# Patient Record
Sex: FEMALE | Race: WHITE
[De-identification: names, ages, dates, MRNs, and addresses within clinical notes are randomized per-mention and may not be internally consistent; named-entity substitution may affect disease eponyms.]

---

## 2017-12-17 ENCOUNTER — HOSPITAL ENCOUNTER (INPATIENT)
Dept: HOSPITAL 75 - ER | Age: 22
LOS: 2 days | Discharge: HOME | DRG: 747 | End: 2017-12-19
Attending: OBSTETRICS & GYNECOLOGY | Admitting: OBSTETRICS & GYNECOLOGY
Payer: MEDICAID

## 2017-12-17 VITALS — BODY MASS INDEX: 28.71 KG/M2 | HEIGHT: 62 IN | WEIGHT: 156 LBS

## 2017-12-17 VITALS — SYSTOLIC BLOOD PRESSURE: 119 MMHG | DIASTOLIC BLOOD PRESSURE: 66 MMHG

## 2017-12-17 DIAGNOSIS — Z23: ICD-10-CM

## 2017-12-17 DIAGNOSIS — N76.4: Primary | ICD-10-CM

## 2017-12-17 LAB
ALBUMIN SERPL-MCNC: 4.2 GM/DL (ref 3.2–4.5)
ALT SERPL-CCNC: 13 U/L (ref 0–55)
ANION GAP SERPL CALC-SCNC: 14 MMOL/L (ref 5–14)
APTT BLD: 27 SEC (ref 24–35)
AST SERPL-CCNC: 11 U/L (ref 5–34)
BASOPHILS # BLD AUTO: 0 10^3/UL (ref 0–0.1)
BASOPHILS NFR BLD AUTO: 0 % (ref 0–10)
BILIRUB SERPL-MCNC: 0.3 MG/DL (ref 0.1–1)
BILIRUB UR QL STRIP: NEGATIVE
BUN SERPL-MCNC: 9 MG/DL (ref 7–18)
BUN/CREAT SERPL: 11
CALCIUM SERPL-MCNC: 10 MG/DL (ref 8.5–10.1)
CHLORIDE SERPL-SCNC: 102 MMOL/L (ref 98–107)
CO2 SERPL-SCNC: 26 MMOL/L (ref 21–32)
CREAT SERPL-MCNC: 0.82 MG/DL (ref 0.6–1.3)
EOSINOPHIL # BLD AUTO: 0 10^3/UL (ref 0–0.3)
EOSINOPHIL NFR BLD AUTO: 0 % (ref 0–10)
ERYTHROCYTE [DISTWIDTH] IN BLOOD BY AUTOMATED COUNT: 11.5 % (ref 10–14.5)
GFR SERPLBLD BASED ON 1.73 SQ M-ARVRAT: > 60 ML/MIN
GLUCOSE SERPL-MCNC: 92 MG/DL (ref 70–105)
HS C REACTIVE PROTEIN: 3.08 MG/DL (ref 0–0.5)
INR PPP: 1.1 (ref 0.8–1.4)
KETONES UR QL STRIP: NEGATIVE
LEUKOCYTE ESTERASE UR QL STRIP: (no result)
LYMPHOCYTES # BLD AUTO: 2 X 10^3 (ref 1–4)
LYMPHOCYTES NFR BLD AUTO: 15 % (ref 12–44)
MCH RBC QN AUTO: 31 PG (ref 25–34)
MCHC RBC AUTO-ENTMCNC: 34 G/DL (ref 32–36)
MCV RBC AUTO: 89 FL (ref 80–99)
MONOCYTES # BLD AUTO: 1 X 10^3 (ref 0–1)
MONOCYTES NFR BLD AUTO: 8 % (ref 0–12)
NEUTROPHILS # BLD AUTO: 10.5 X 10^3 (ref 1.8–7.8)
NEUTROPHILS NFR BLD AUTO: 77 % (ref 42–75)
NITRITE UR QL STRIP: NEGATIVE
PH UR STRIP: 7 [PH] (ref 5–9)
PLATELET # BLD: 363 10^3/UL (ref 130–400)
PMV BLD AUTO: 9 FL (ref 7.4–10.4)
POTASSIUM SERPL-SCNC: 3.5 MMOL/L (ref 3.6–5)
PROT SERPL-MCNC: 8.3 GM/DL (ref 6.4–8.2)
PROT UR QL STRIP: NEGATIVE
PROTHROMBIN TIME: 14.5 SEC (ref 12.2–14.7)
RBC # BLD AUTO: 4.71 10^6/UL (ref 4.35–5.85)
SODIUM SERPL-SCNC: 142 MMOL/L (ref 135–145)
SP GR UR STRIP: 1.01 (ref 1.02–1.02)
SQUAMOUS #/AREA URNS HPF: >50 /HPF
UROBILINOGEN UR-MCNC: NORMAL MG/DL
WBC # BLD AUTO: 13.6 10^3/UL (ref 4.3–11)
WBC #/AREA URNS HPF: (no result) /HPF

## 2017-12-17 PROCEDURE — 85730 THROMBOPLASTIN TIME PARTIAL: CPT

## 2017-12-17 PROCEDURE — 87070 CULTURE OTHR SPECIMN AEROBIC: CPT

## 2017-12-17 PROCEDURE — 96374 THER/PROPH/DIAG INJ IV PUSH: CPT

## 2017-12-17 PROCEDURE — 87077 CULTURE AEROBIC IDENTIFY: CPT

## 2017-12-17 PROCEDURE — 84703 CHORIONIC GONADOTROPIN ASSAY: CPT

## 2017-12-17 PROCEDURE — 81000 URINALYSIS NONAUTO W/SCOPE: CPT

## 2017-12-17 PROCEDURE — 96375 TX/PRO/DX INJ NEW DRUG ADDON: CPT

## 2017-12-17 PROCEDURE — 87081 CULTURE SCREEN ONLY: CPT

## 2017-12-17 PROCEDURE — 85610 PROTHROMBIN TIME: CPT

## 2017-12-17 PROCEDURE — 85025 COMPLETE CBC W/AUTO DIFF WBC: CPT

## 2017-12-17 PROCEDURE — 96361 HYDRATE IV INFUSION ADD-ON: CPT

## 2017-12-17 PROCEDURE — 87186 SC STD MICRODIL/AGAR DIL: CPT

## 2017-12-17 PROCEDURE — 87205 SMEAR GRAM STAIN: CPT

## 2017-12-17 PROCEDURE — 87040 BLOOD CULTURE FOR BACTERIA: CPT

## 2017-12-17 PROCEDURE — 86141 C-REACTIVE PROTEIN HS: CPT

## 2017-12-17 PROCEDURE — 80053 COMPREHEN METABOLIC PANEL: CPT

## 2017-12-17 PROCEDURE — 87075 CULTR BACTERIA EXCEPT BLOOD: CPT

## 2017-12-17 PROCEDURE — 36415 COLL VENOUS BLD VENIPUNCTURE: CPT

## 2017-12-17 PROCEDURE — 83605 ASSAY OF LACTIC ACID: CPT

## 2017-12-17 RX ADMIN — SODIUM CHLORIDE SCH MLS/HR: 900 INJECTION, SOLUTION INTRAVENOUS at 23:12

## 2017-12-17 RX ADMIN — METRONIDAZOLE SCH MLS/HR: 5 INJECTION, SOLUTION INTRAVENOUS at 23:12

## 2017-12-17 NOTE — ED GU-FEMALE
General


Chief Complaint:  -Female


Stated Complaint:  VAGINAL CYST


Nursing Triage Note:  


PT PRESENTS TO ER WITH COMPLAINT OF VAGINAL CYST/ BUMP. STATES THAT SHE WENT TO 


[facility] ON FRIDAY 12/15/17. SHE STATES THERE THEY RAN MULTIPLE TESTS AND GAVE HER 

AN 


ANTIBIOTIC. SHE IS UNAWARE OF WHAT ANTIBIOTIC SHE WAS GIVEN. ALSO STATES SHE IS 


HAVING WHITE VAGINAL DISCHARGE, BUT DOES NOT FEEL IT IS FROM THE CYST.


Nursing Sepsis Screen:  No Definite Risk


Source:  patient, family (sister), other (boyfriend)


Exam Limitations:  no limitations





History of Present Illness


Time seen by provider:  18:55


Initial Comments


22-year-old female patient presents to the emergency department with complaints 

of approximately 2 weeks onset left labial swelling and pain.  Patient reports 

contacting her primary care provider at our medical clinic and was prescribed 

Diflucan.  Patient reports seeing the walk-in clinic at [facility] 

on Thursday and given 1000 mg of azithromycin.  Pap smear and vaginal cultures 

were obtained at that time.  Patient states she was scheduled with Dr. Darnell 

tomorrow as an outpatient.  Patient reports increased swelling, pain, redness, 

and vaginal discharge today.  Does complain of chills and sweats.  She is 

unable to sit at this time.


Timing/Duration:  getting worse, other (2 weeks onset)


Severity/Quality:  severe


Location:  other (left labia)


Activities at Onset:  none


Prior Genitourinary Problems:  none


Sexual [place] History:  less than 2 months ago, single partner


Modifying Factors:  Worsens With Movement, Worsens With Palpation, Worsens With 

Other (worse with sitting)





Allergies and Home Medications


Allergies


Coded Allergies:  


     No Known Drug Allergies (Unverified , 12/17/17)





Constitutional:  chills, diaphoresis, No fever, malaise


Respiratory:  no symptoms reported


Cardiovascular:  no symptoms reported


Gastrointestinal:  No abdominal pain, No constipation, No diarrhea, No nausea, 

No vomiting


Genitourinary:  see HPI, discharge, denies dysuria, denies frequency, denies 

flank pain, denies hematuria, denies pain


Musculoskeletal:  No back pain


Skin:  see HPI, lumps (left labia)


Psychiatric/Neurological:  No Symptoms Reported


All Other Systemes Reviewed


Negative Unless Noted:  Yes (Negative excepted noted.)





Past Medical-Social-Family Hx


Patient Social History


Alcohol Use:  Denies Use


Recreational Drug Use:  No


Smoking Status:  Never a Smoker


Recent Foreign Travel:  No


Contact w/Someone Who Travel:  No


Recent Infectious Disease Expo:  No


Recent Hopitalizations:  No


Physical Abuse:  No


Sexual Abuse:  No





Seasonal Allergies


Seasonal Allergies:  No





Surgeries


History of Surgeries:  No





Respiratory


History of Respiratory Disorde:  No





Cardiovascular


History of Cardiac Disorders:  No





Neurological


History of Neurological Disord:  No





Reproductive System


Hx Reproductive Disorders:  No


Sexually Transmitted Disease:  No


HIV/AIDS:  No





Genitourinary


History of Genitourinary Disor:  No





Gastrointestinal


History of Gastrointestinal Di:  No





Musculoskeletal


History of Musculoskeletal Dis:  No





Endocrine


History of Endocrine Disorders:  No





HEENT


History of HEENT Disorders:  No





Cancer


History of Cancer:  No





Psychosocial


History of Psychiatric Problem:  No


Suicide Risk Score:  0





Integumentary


History of Skin or Integumenta:  No





Blood Transfusions


History of Blood Disorders:  No


Adverse Reaction to a Blood Tr:  No





Reviewed Nursing Assessment


Reviewed/Agree w Nursing PMH:  Yes





Family Medical History


Significant Family History:  No Pertinent Family Hx





Physical Exam


Vital Signs





Vital Sign - Last 12Hours








 12/17/17





 18:16


 


Temp 100.0


 


Pulse 110


 


Resp 20


 


B/P (MAP) 126/80 (95)


 


Pulse Ox 98


 


O2 Delivery Room Air





Capillary Refill : Less Than 3 Seconds


General Appearance:  WD/WN, no apparent distress


HEENT:  PERRL/EOMI, pharynx normal


Neck:  supple, normal inspection


Cardiovascular:  normal peripheral pulses, regular rate, rhythm, no murmur


Respiratory:  lungs clear, normal breath sounds, no respiratory distress, no 

accessory muscle use


Gastrointestinal:  normal bowel sounds, non tender, soft, no organomegaly, No 

distended


Pelvic:  other (left labia swollen (approximately the size of a nectarine), 

erythematous, tenderness with 2 areas of ecchymosis.  patient unable to 

tolerate formal pelvic exam due to pain.)


Back:  normal inspection, no CVA tenderness


Extremities:  no pedal edema, normal capillary refill


Neurologic/Psychiatric:  alert, normal mood/affect, oriented x 3


Skin:  normal color, warm/dry, other (left labia swollen (approximately the 

size of a nectarine), erythematous, tenderness with 2 areas of ecchymosis.  )





Focused Exam


Evaluation


Sepsis Stage:  Sepsis


Possible Source:  Genitouriary


Lactate Level


Laboratory Tests


12/17/17 19:30: Lactic Acid Level 1.25





Time of Focused Exam:  19:30


Respiratory:  Lungs Clear, Normal Breath Sounds, No Accessory Muscle Use, No 

Respiratory Distress


Cardiovascular:  Regular Rate, Rhythm, No Murmur, Normal Peripheral Pulses


Capillary Refill:  Less Than 3 Seconds


Peripheral Pulses:  2+ Dorsalis Pedis (R), 2+ Left Dors-Pedis (L), 2+ Radial 

Pulses (R), 2+ Radial Pulses (L)


Skin:  normal color, warm/dry, No rash, No ulcerations, other (left labia 

swollen (approximately the size of a nectarine), erythematous, tenderness with 

2 areas of ecchymosis.  )


Lactic Acid Level








Progress/Results/Core Measures


Suspected Sepsis


Recent Fever Within 48 Hours:  No


Infection Criteria Present:  None


New/Unexplained  Altered Menta:  No


Sepsis Screen:  No Definite Risk


Sepsis Diagnosis:  


SIRS


Temperature:98.0 


Pulse: 110 


Respiratory Rate: 20


 


Laboratory Tests


12/17/17 19:30: White Blood Count 13.6H


Blood Pressure 126 /80 


Mean: 95


 





Laboratory Tests


12/17/17 19:30: Lactic Acid Level 1.25


Laboratory Tests


12/17/17 19:30: 


Creatinine 0.82, Platelet Count 363, Total Bilirubin 0.3


12/17/17 20:36: INR Comment 1.1








Results/Orders


Lab Results





Laboratory Tests








Test


  12/17/17


19:30 12/17/17


20:36 12/17/17


20:44 Range/Units


 


 


White Blood Count


  13.6 H


  


  


  4.3-11.0


10^3/uL


 


Red Blood Count


  4.71 


  


  


  4.35-5.85


10^6/uL


 


Hemoglobin 14.4    11.5-16.0  G/DL


 


Hematocrit 42    35-52  %


 


Mean Corpuscular Volume 89    80-99  FL


 


Mean Corpuscular Hemoglobin 31    25-34  PG


 


Mean Corpuscular Hemoglobin


Concent 34 


  


  


  32-36  G/DL


 


 


Red Cell Distribution Width 11.5    10.0-14.5  %


 


Platelet Count


  363 


  


  


  130-400


10^3/uL


 


Mean Platelet Volume 9.0    7.4-10.4  FL


 


Neutrophils (%) (Auto) 77 H   42-75  %


 


Lymphocytes (%) (Auto) 15    12-44  %


 


Monocytes (%) (Auto) 8    0-12  %


 


Eosinophils (%) (Auto) 0    0-10  %


 


Basophils (%) (Auto) 0    0-10  %


 


Neutrophils # (Auto) 10.5 H   1.8-7.8  X 10^3


 


Lymphocytes # (Auto) 2.0    1.0-4.0  X 10^3


 


Monocytes # (Auto) 1.0    0.0-1.0  X 10^3


 


Eosinophils # (Auto)


  0.0 


  


  


  0.0-0.3


10^3/uL


 


Basophils # (Auto)


  0.0 


  


  


  0.0-0.1


10^3/uL


 


Sodium Level 142    135-145  MMOL/L


 


Potassium Level 3.5 L   3.6-5.0  MMOL/L


 


Chloride Level 102      MMOL/L


 


Carbon Dioxide Level 26    21-32  MMOL/L


 


Anion Gap 14    5-14  MMOL/L


 


Blood Urea Nitrogen 9    7-18  MG/DL


 


Creatinine


  0.82 


  


  


  0.60-1.30


MG/DL


 


Estimat Glomerular Filtration


Rate > 60 


  


  


   


 


 


BUN/Creatinine Ratio 11     


 


Glucose Level 92      MG/DL


 


Lactic Acid Level


  1.25 


  


  


  0.50-2.00


MMOL/L


 


Calcium Level 10.0    8.5-10.1  MG/DL


 


Total Bilirubin 0.3    0.1-1.0  MG/DL


 


Aspartate Amino Transf


(AST/SGOT) 11 


  


  


  5-34  U/L


 


 


Alanine Aminotransferase


(ALT/SGPT) 13 


  


  


  0-55  U/L


 


 


Alkaline Phosphatase 90      U/L


 


C-Reactive Protein High


Sensitivity 3.08 H


  


  


  0.00-0.50


MG/DL


 


Total Protein 8.3 H   6.4-8.2  GM/DL


 


Albumin 4.2    3.2-4.5  GM/DL


 


Prothrombin Time  14.5   12.2-14.7  SEC


 


INR Comment  1.1   0.8-1.4  


 


Activated Partial


Thromboplast Time 


  27 


  


  24-35  SEC


 


 


Urine Color   YELLOW   


 


Urine Clarity


  


  


  SLIGHTLY


CLOUDY  


 


 


Urine pH   7  5-9  


 


Urine Specific Gravity   1.010 L 1.016-1.022  


 


Urine Protein   NEGATIVE  NEGATIVE  


 


Urine Glucose (UA)   NEGATIVE  NEGATIVE  


 


Urine Ketones   NEGATIVE  NEGATIVE  


 


Urine Nitrite   NEGATIVE  NEGATIVE  


 


Urine Bilirubin   NEGATIVE  NEGATIVE  


 


Urine Urobilinogen   NORMAL  NORMAL  MG/DL


 


Urine Leukocyte Esterase   1+ H NEGATIVE  


 


Urine RBC (Auto)   1+ H NEGATIVE  


 


Urine RBC   NONE   /HPF


 


Urine WBC   2-5   /HPF


 


Urine Squamous Epithelial


Cells 


  


  >50 H


   /HPF


 


 


Urine Crystals   NONE   /LPF


 


Urine Bacteria   FEW H  /HPF


 


Urine Casts   NONE   /LPF


 


Urine Mucus   NEGATIVE   /LPF


 


Urine Culture Indicated   NO   








My Orders





Orders - TAMMIE MOORE


Morphine  Injection (Morphine  Injection (12/17/17 19:07)


Cbc With Automated Diff (12/17/17 19:16)


Comprehensive Metabolic Panel (12/17/17 19:16)


Hs C Reactive Protein (12/17/17 19:16)


Lactic Acid Analyzer (12/17/17 19:16)


Saline Lock/Iv-Start (12/17/17 19:16)


Morphine  Injection (Morphine  Injection (12/17/17 19:16)


Ns Iv 1000 Ml (Sodium Chloride 0.9%) (12/17/17 19:16)


Blood Culture (12/17/17 20:06)


Wound Culture (12/17/17 20:06)


Protime With Inr (12/17/17 20:15)


Partial Thromboplastin Time (12/17/17 20:15)


Levofloxacin 750 Mg/150 Ml Iv (Levaquin (12/17/17 20:15)


Vital Signs Adult Sepsis Patie Q1H (12/17/17 20:15)


Remove Rings In Anticipation O (12/17/17 20:15)


Ketorolac Injection (Toradol Injection) (12/17/17 20:15)


Ns Iv 1000 Ml (Sodium Chloride 0.9%) (12/17/17 20:15)


Ua Culture If Indicated (12/17/17 20:24)


Urine Pregnancy Bedside (12/17/17 20:37)





Medications Given in ED





Current Medications








 Medications  Dose


 Ordered  Sig/Brett


 Route  Start Time


 Stop Time Status Last Admin


Dose Admin


 


 Levofloxacin/


 Dextrose  750 mg  ONCE  ONCE


 IV  12/17/17 20:15


 12/17/17 20:17 DC 12/17/17 20:26


750 MG


 


 Sodium Chloride  1,000 ml @ 


 0 mls/hr  Q0M ONCE


 IV  12/17/17 19:16


 12/17/17 19:18 DC 12/17/17 19:41


1,000 MLS/HR


 


 Sodium Chloride  1,000 ml @ 


 0 mls/hr  Q0M ONCE


 IV  12/17/17 20:15


 12/17/17 20:17 DC 12/17/17 20:25


1,000 MLS/HR








Vital Signs/I&O





Vital Sign - Last 12Hours








 12/17/17 12/17/17 12/17/17





 18:16 22:42 23:42


 


Temp 100.0 99.0 97.0


 


Pulse 110 100 95


 


Resp 20 20 18


 


B/P (MAP) 126/80 (95)  119/66 (83)


 


Pulse Ox 98 99 99


 


O2 Delivery Room Air Room Air Room Air














Intake and Output 


 


 12/18/17





 00:00


 


Intake Total 1000 ml


 


Balance 1000 ml





Capillary Refill : Less Than 3 Seconds








Blood Pressure Mean:  95











Departure


Communication (Admissions)


Time/Spoke to Admitting Phy:  20:27


Communication


dr. mcneill graciously accepts patient to his GYN service for IV antibiotics and 

IVF.


Progress Notes


patient noted to have a WBC count of 13.6 with a temp of 100.0 which meets 

criteria for sepsis with known infection of the left labia.  all laboratory 

findings and plan for admission discussed with the patient.  Patient verbalizes 

understanding and agrees with plan for admit.





Impression


Impression:  


 Primary Impression:  


 Sepsis


 Qualified Codes:  A41.9 - Sepsis, unspecified organism


 Additional Impression:  


 Left genital labial abscess


Disposition:  09 ADMITTED AS INPATIENT


Condition:  Stable





Admissions


Decision to Admit Reason:  Admit from ER (General)


Decision to Admit/Date:  Dec 17, 2017


Time/Decision to Admit Time:  20:25





Departure-Patient Inst.


Referrals:  


[facility] (PCP/Family)


Primary Care Physician


Scripts


No Active Prescriptions or Reported Meds











TAMMIE MOORE Dec 17, 2017 18:55

## 2017-12-17 NOTE — XMS REPORT
Continuity of Care Document

 Created on: 2017



ANDRADE BARLOW

External Reference #: [id number]

: 1995

Sex: Female



Demographics







 Preferred Language  Unknown

 

 Marital Status  Unknown

 

 [employer] Affiliation  Unknown

 

 Race  Unknown

 

 Ethnic Group  Unknown





Author







 Author  [facility]

 

 Address  Unknown

 

 Phone  Unavailable



              



Allergies

      



There is no data.                  



Medications

      



There is no data.                  



Problems

      





 Date Dx Coded            Attending            Type            Code            
Diagnosis            Diagnosed By        

 

 09/15/2010                                      558.9            
GASTROENTERITIS NONINFECTIOUS                     

 

 2011                                      V70.3            SPORTS/SCHOOL 
EXAM                     

 

 2011                                      845.00            SPRAIN/
STRAIN ANKLE                     

 

 2013                                      610.0            BREAST CYST  
                   

 

 2013                                      788.1            DYSURIA      
               

 

 2013                                      V20.2            WELL CHILD   
                  



                            



Procedures

      





 Code            Description            Performed By            Performed On   
     

 

             [id number]                                  UA LONG DIP                
                   2013        

 

             [id number]                                  Audiogram (Screening)      
                             2013        



                    



Results

      



There is no data.              



Encounters

      





 ACCT No.            Visit Date/Time            Discharge            Status    
        Pt. Type            Provider            Facility            Loc./Unit  
          Complaint        

 

 [id number]            2013 14:53:00            2013 23:59:59          
  [facility]            Outpatient                                                    
        

 

 [id number]            10/14/2013 19:39:00            10/14/2013 21:17:00    
        [facility]            Emergency

## 2017-12-17 NOTE — XMS REPORT
Continuity of Care Document

 Created on: 2017



ANDRADE BARLOW

External Reference #: [id number]

: 1995

Sex: Female



Demographics







 Preferred Language  Unknown

 

 Marital Status  Unknown

 

 Latter-day Affiliation  Unknown

 

 Race  Unknown

 

 Ethnic Group  Unknown





Author







 Author  [facility]

 

 Address  Unknown

 

 Phone  Unavailable



              



Allergies

      



There is no data.                  



Medications

      



There is no data.                  



Problems

      





 Date Dx Coded            Attending            Type            Code            
Diagnosis            Diagnosed By        

 

 09/15/2010                                      558.9            
GASTROENTERITIS NONINFECTIOUS                     

 

 2011                                      V70.3            SPORTS/SCHOOL 
EXAM                     

 

 2011                                      845.00            SPRAIN/
STRAIN ANKLE                     

 

 2013                                      610.0            BREAST CYST  
                   

 

 2013                                      788.1            DYSURIA      
               

 

 2013                                      V20.2            WELL CHILD   
                  



                            



Procedures

      





 Code            Description            Performed By            Performed On   
     

 

             20203                                  UA LONG DIP                
                   2013        

 

             [id number]                                  Audiogram (Screening)      
                             2013        



                    



Results

      



There is no data.              



Encounters

      





 ACCT No.            Visit Date/Time            Discharge            Status    
        Pt. Type            Provider            Facility            Loc./Unit  
          Complaint        

 

 [id number]            2013 14:53:00            2013 23:59:59          
  [facility]            Outpatient                                                    
        

 

 [id number]            10/14/2013 19:39:00            10/14/2013 21:17:00    
        [facility]            Emergency

## 2017-12-18 VITALS — DIASTOLIC BLOOD PRESSURE: 74 MMHG | SYSTOLIC BLOOD PRESSURE: 124 MMHG

## 2017-12-18 VITALS — SYSTOLIC BLOOD PRESSURE: 115 MMHG | DIASTOLIC BLOOD PRESSURE: 69 MMHG

## 2017-12-18 VITALS — DIASTOLIC BLOOD PRESSURE: 72 MMHG | SYSTOLIC BLOOD PRESSURE: 119 MMHG

## 2017-12-18 VITALS — DIASTOLIC BLOOD PRESSURE: 77 MMHG | SYSTOLIC BLOOD PRESSURE: 121 MMHG

## 2017-12-18 VITALS — DIASTOLIC BLOOD PRESSURE: 55 MMHG | SYSTOLIC BLOOD PRESSURE: 96 MMHG

## 2017-12-18 VITALS — SYSTOLIC BLOOD PRESSURE: 117 MMHG | DIASTOLIC BLOOD PRESSURE: 72 MMHG

## 2017-12-18 LAB
ALBUMIN SERPL-MCNC: 2.9 GM/DL (ref 3.2–4.5)
ALT SERPL-CCNC: 11 U/L (ref 0–55)
ANION GAP SERPL CALC-SCNC: 6 MMOL/L (ref 5–14)
AST SERPL-CCNC: 9 U/L (ref 5–34)
BASOPHILS # BLD AUTO: 0 10^3/UL (ref 0–0.1)
BASOPHILS NFR BLD AUTO: 0 % (ref 0–10)
BILIRUB SERPL-MCNC: 0.2 MG/DL (ref 0.1–1)
BUN SERPL-MCNC: 11 MG/DL (ref 7–18)
BUN/CREAT SERPL: 15
CALCIUM SERPL-MCNC: 8 MG/DL (ref 8.5–10.1)
CHLORIDE SERPL-SCNC: 112 MMOL/L (ref 98–107)
CO2 SERPL-SCNC: 23 MMOL/L (ref 21–32)
CREAT SERPL-MCNC: 0.72 MG/DL (ref 0.6–1.3)
EOSINOPHIL # BLD AUTO: 0.1 10^3/UL (ref 0–0.3)
EOSINOPHIL NFR BLD AUTO: 1 % (ref 0–10)
ERYTHROCYTE [DISTWIDTH] IN BLOOD BY AUTOMATED COUNT: 11.5 % (ref 10–14.5)
GFR SERPLBLD BASED ON 1.73 SQ M-ARVRAT: > 60 ML/MIN
GLUCOSE SERPL-MCNC: 121 MG/DL (ref 70–105)
HS C REACTIVE PROTEIN: 1.92 MG/DL (ref 0–0.5)
LYMPHOCYTES # BLD AUTO: 2.3 X 10^3 (ref 1–4)
LYMPHOCYTES NFR BLD AUTO: 20 % (ref 12–44)
MCH RBC QN AUTO: 31 PG (ref 25–34)
MCHC RBC AUTO-ENTMCNC: 34 G/DL (ref 32–36)
MCV RBC AUTO: 92 FL (ref 80–99)
MONOCYTES # BLD AUTO: 1.2 X 10^3 (ref 0–1)
MONOCYTES NFR BLD AUTO: 10 % (ref 0–12)
NEUTROPHILS # BLD AUTO: 8 X 10^3 (ref 1.8–7.8)
NEUTROPHILS NFR BLD AUTO: 69 % (ref 42–75)
PLATELET # BLD: 285 10^3/UL (ref 130–400)
PMV BLD AUTO: 8.9 FL (ref 7.4–10.4)
POTASSIUM SERPL-SCNC: 3.9 MMOL/L (ref 3.6–5)
PROT SERPL-MCNC: 5.6 GM/DL (ref 6.4–8.2)
RBC # BLD AUTO: 3.62 10^6/UL (ref 4.35–5.85)
SODIUM SERPL-SCNC: 141 MMOL/L (ref 135–145)
WBC # BLD AUTO: 11.6 10^3/UL (ref 4.3–11)

## 2017-12-18 PROCEDURE — 0U9MXZX DRAINAGE OF VULVA, EXTERNAL APPROACH, DIAGNOSTIC: ICD-10-PCS | Performed by: OBSTETRICS & GYNECOLOGY

## 2017-12-18 PROCEDURE — 0H99XZX DRAINAGE OF PERINEUM SKIN, EXTERNAL APPROACH, DIAGNOSTIC: ICD-10-PCS | Performed by: OBSTETRICS & GYNECOLOGY

## 2017-12-18 RX ADMIN — DOCUSATE SODIUM SCH MG: 100 CAPSULE ORAL at 09:27

## 2017-12-18 RX ADMIN — KETOROLAC TROMETHAMINE PRN MG: 30 INJECTION, SOLUTION INTRAMUSCULAR; INTRAVENOUS at 04:05

## 2017-12-18 RX ADMIN — HYDROMORPHONE HYDROCHLORIDE PRN MG: 2 INJECTION, SOLUTION INTRAMUSCULAR; INTRAVENOUS; SUBCUTANEOUS at 08:48

## 2017-12-18 RX ADMIN — METRONIDAZOLE SCH MLS/HR: 5 INJECTION, SOLUTION INTRAVENOUS at 08:16

## 2017-12-18 RX ADMIN — MORPHINE SULFATE PRN MG: 10 INJECTION, SOLUTION INTRAMUSCULAR; INTRAVENOUS at 13:32

## 2017-12-18 RX ADMIN — ONDANSETRON PRN MG: 2 INJECTION, SOLUTION INTRAMUSCULAR; INTRAVENOUS at 15:08

## 2017-12-18 RX ADMIN — DOCUSATE SODIUM SCH MG: 100 CAPSULE ORAL at 20:40

## 2017-12-18 RX ADMIN — ONDANSETRON PRN MG: 2 INJECTION, SOLUTION INTRAMUSCULAR; INTRAVENOUS at 10:28

## 2017-12-18 RX ADMIN — FAMOTIDINE SCH MG: 10 INJECTION INTRAVENOUS at 09:26

## 2017-12-18 RX ADMIN — SODIUM CHLORIDE SCH MLS/HR: 900 INJECTION, SOLUTION INTRAVENOUS at 04:08

## 2017-12-18 RX ADMIN — METRONIDAZOLE SCH MLS/HR: 5 INJECTION, SOLUTION INTRAVENOUS at 18:47

## 2017-12-18 RX ADMIN — SODIUM CHLORIDE, SODIUM LACTATE, POTASSIUM CHLORIDE, CALCIUM CHLORIDE, AND DEXTROSE MONOHYDRATE SCH MLS/HR: 600; 310; 30; 20; 5 INJECTION, SOLUTION INTRAVENOUS at 20:33

## 2017-12-18 RX ADMIN — SODIUM CHLORIDE, SODIUM LACTATE, POTASSIUM CHLORIDE, CALCIUM CHLORIDE, AND DEXTROSE MONOHYDRATE SCH MLS/HR: 600; 310; 30; 20; 5 INJECTION, SOLUTION INTRAVENOUS at 08:17

## 2017-12-18 RX ADMIN — KETOROLAC TROMETHAMINE PRN MG: 30 INJECTION, SOLUTION INTRAMUSCULAR; INTRAVENOUS at 15:08

## 2017-12-18 RX ADMIN — MORPHINE SULFATE PRN MG: 10 INJECTION, SOLUTION INTRAMUSCULAR; INTRAVENOUS at 13:39

## 2017-12-18 RX ADMIN — HYDROMORPHONE HYDROCHLORIDE PRN MG: 2 INJECTION, SOLUTION INTRAMUSCULAR; INTRAVENOUS; SUBCUTANEOUS at 18:47

## 2017-12-18 RX ADMIN — FAMOTIDINE SCH MG: 10 INJECTION INTRAVENOUS at 20:34

## 2017-12-18 RX ADMIN — KETOROLAC TROMETHAMINE PRN MG: 30 INJECTION, SOLUTION INTRAMUSCULAR; INTRAVENOUS at 20:36

## 2017-12-18 NOTE — OPERATIVE REPORT
DATE OF SERVICE:  



PREOPERATIVE DIAGNOSIS:

22-year-old female with left labial abscess.



POSTOPERATIVE DIAGNOSIS:

22-year-old female with left labial abscess.



PROCEDURE:

Incision and drainage of left labial abscess.



SURGEON:

Dr. Jordan Banks.



ANESTHESIA:

LMA.



ESTIMATED BLOOD LOSS:

Minimal.



URINE OUTPUT:

500 mL drained by indwelling catheter placement at the end of the procedure.



FLUIDS:

4000 mL lactate Ringer's solution.



FINDINGS:

A purulent exudate filled abscess of the left labia approximately 5 to 6 cm in

length from front to back and approximately 3 to 4 cm wide.  The dorsal margin

of this extends all the way to the edge of the buttocks.  The ventral margin

stops just proximal to the anterior fourchette.  There are two openings evident

upon prep and there is a copious amount of purulent exudate expressed.



SPECIMEN SENT:

Abscess cultures.



INDICATIONS FOR PROCEDURE:

This 22-year-old female was admitted from the Emergency Department last night

with a low grade temperature as well as a mildly elevated white count of 14. 

She reported dealing with this area on her vulva for the past two to three

weeks, underwent 1000 mg of azithromycin treatment for possible STD at [facility] as well as attempted several trials of over the counter

antifungals all without resolution and actually worsening of her symptoms.  This

morning, upon evaluation, the vulva is as described in my findings above.  I

discussed with the patient conservative management has failed.  At this point,

we will proceed with operative incision and drainage.  The procedure was

detailed with the patient including risk and risk of anesthesia.  After all of

her questions were answered, consent is obtained.  The patient was taken to the

operating room.



OPERATIVE REPORT IN DETAIL:

Once in the operating room, LMA anesthesia was found to be adequate.  She was

placed in the dorsal lithotomy position, prepped and draped in normal sterile

fashion.  The more medial opening of the wound opens up and begins draining upon

prepping the patient, I go ahead and incised the margins of the necrotic area of

this opening and resected using Metzenbaum scissors.  There is another necrotic

area on the surface of the skin that is incised as well.  At first, I am

suspicious that this is a separate pocket of fluid and abscess; however, upon

opening, it is connecting and there is a fenestration of the labia.  I clear out

all extent of the abscess exploring all extent of the abscess and proceeded with

copiously irrigating the abscess defect using gentamicin 80 mg in 1000 mL of

normal saline.  Approximately 800 mL of saline of this concentration is used to

irrigate, after which there was no active bleeding noted from any my dissection

planes.  I then packed the defect using quarter inch iodoform.  Tails were left

out of both incisional openings.  A [place] catheter is placed.  The patient

tolerated the procedure well, sent to recovery area in stable condition 750mg of

Levaquin, 500 mg of Flagyl are being given to the patient since admission, she

has received one dose of Levaquin and three doses of Flagyl at this point.





Job ID: [id number]

DocumentID: [id number]

Dictated Date:  12/18/2017 13:18:13

Transcription Date: 12/18/2017 20:15:04

Dictated By: DO FLORENCIO FAULKNER

## 2017-12-18 NOTE — HISTORY & PHYSICAL-OB/GYN
History of Present Illness


History of Present Illness


Reason for visit/HPI


Left labial abscess.  This patient reports having left labial swelling which 

has gotten substantially worse in the past 2-3 days.  Sought care at [facility] where 

she was given, azithromycin and tested for STDs.  She tried treatment with 

antifungal without any improvement and began running temp, and pain became much 

worse so she present to the ER.


Date of Admission


Dec 17, 2017 at 20:45


Date Seen by Provider:  Dec 18, 2017


Time Seen by Provider:  08:15


I consulted on this patient on


17


 08:25


Attending Physician


Sally Candelario DO


Admitting Physician


No,Local Physician


Consult








Allergies and Home Medications


Allergies


Coded Allergies:  


     No Known Drug Allergies (Unverified , 17)





Past Medical-Social-Family Hx


Patient Social History


Marrital Status:  single


Number of Children:  0


Number of living children:  0


Alcohol Use:  Denies Use


Recreational Drug Use:  No


Smoking Status:  Never a Smoker


Physical Abuse Screen:  No


Sexual Abuse:  No


Recent Foreign Travel:  No


Contact w/other who traveled:  No


Recent Hopitalizations:  No


Recent Infectious Disease Expo:  No





Seasonal Allergies


Seasonal Allergies:  No





Surgeries


No





Respiratory


No





Cardiovascular


No





Neurological


No





Reproductive System


Hx Reproductive Disorders:  No


Sexually Transmitted Disease:  No


HIV/AIDS:  No





Genitourinary


No





Gastrointestinal


No





Musculoskeletal


No





Endocrine


History of Endocrine Disorders:  No





HEENT


History of HEENT Disorders:  No





Cancer


No





Psychosocial


History of Psychiatric Problem:  No





Integumentary


History of Skin or Integumenta:  No





Blood Transfusions


History of Blood Disorders:  No


Adverse Reaction to a Blood Tr:  No





Reviewed Nursing Assessment


Reviewed/Agree w Nursing PMH:  Yes





Family Medical History


Significant Family History:  No Pertinent Family Hx





Constitutional:  see HPI


EENTM:  see HPI


Respiratory:  see HPI


Cardiovascular:  see HPI


Gastrointestinal:  see HPI


Genitourinary:  see HPI


Pregnant:  No


Musculoskeletal:  see HPI


Skin:  see HPI


Psychiatric/Neurological:  See HPI


All Other Systems Reviewed


Negative Unless Noted:  Yes





Physical Exam


Physical Exam


Vital Signs





Vital Signs








  Date Time  Temp Pulse Resp B/P (MAP) Pulse Ox O2 Delivery O2 Flow Rate FiO2


 


17 04:05 98.0 77 17 115/69 (84) 100 Room Air  


 


17 23:42 97.0 95 18 119/66 (83) 99 Room Air  


 


17 22:42 99.0 100 20  99 Room Air  


 


17 18:16 100.0 110 20 126/80 (95) 98 Room Air  














I & O 


 


 17





 07:00


 


Intake Total 3150 ml


 


Balance 3150 ml





Capillary Refill : Less Than 3 Seconds


Labs


Laboratory Tests


17 19:30: 


White Blood Count 13.6H, Red Blood Count 4.71, Hemoglobin 14.4, Hematocrit 42, 

Mean Corpuscular Volume 89, Mean Corpuscular Hemoglobin 31, Mean Corpuscular 

Hemoglobin Concent 34, Red Cell Distribution Width 11.5, Platelet Count 363, 

Mean Platelet Volume 9.0, Neutrophils (%) (Auto) 77H, Lymphocytes (%) (Auto) 15

, Monocytes (%) (Auto) 8, Eosinophils (%) (Auto) 0, Basophils (%) (Auto) 0, 

Neutrophils # (Auto) 10.5H, Lymphocytes # (Auto) 2.0, Monocytes # (Auto) 1.0, 

Eosinophils # (Auto) 0.0, Basophils # (Auto) 0.0, Sodium Level 142, Potassium 

Level 3.5L, Chloride Level 102, Carbon Dioxide Level 26, Anion Gap 14, Blood 

Urea Nitrogen 9, Creatinine 0.82, Estimat Glomerular Filtration Rate > 60, BUN/

Creatinine Ratio 11, Glucose Level 92, Lactic Acid Level 1.25, Calcium Level 

10.0, Total Bilirubin 0.3, Aspartate Amino Transf (AST/SGOT) 11, Alanine 

Aminotransferase (ALT/SGPT) 13, Alkaline Phosphatase 90, C-Reactive Protein 

High Sensitivity 3.08H, Total Protein 8.3H, Albumin 4.2


17 20:36: 


Prothrombin Time 14.5, INR Comment 1.1, Activated Partial Thromboplast Time 27


17 20:44: 


Urine Color YELLOW, Urine Clarity SLIGHTLY CLOUDY, Urine pH 7, Urine Specific 

Gravity 1.010L, Urine Protein NEGATIVE, Urine Glucose (UA) NEGATIVE, Urine 

Ketones NEGATIVE, Urine Nitrite NEGATIVE, Urine Bilirubin NEGATIVE, Urine 

Urobilinogen NORMAL, Urine Leukocyte Esterase 1+H, Urine RBC (Auto) 1+H, Urine 

RBC NONE, Urine WBC 2-5, Urine Squamous Epithelial Cells >50H, Urine Crystals 

NONE, Urine Bacteria FEWH, Urine Casts NONE, Urine Mucus NEGATIVE, Urine 

Culture Indicated NO


17 06:02: 


White Blood Count 11.6H, Red Blood Count 3.62L, Hemoglobin 11.2#L, Hematocrit 

33L, Mean Corpuscular Volume 92, Mean Corpuscular Hemoglobin 31, Mean 

Corpuscular Hemoglobin Concent 34, Red Cell Distribution Width 11.5, Platelet 

Count 285, Mean Platelet Volume 8.9, Neutrophils (%) (Auto) 69, Lymphocytes (%) 

(Auto) 20, Monocytes (%) (Auto) 10, Eosinophils (%) (Auto) 1, Basophils (%) (

Auto) 0, Neutrophils # (Auto) 8.0H, Lymphocytes # (Auto) 2.3, Monocytes # (Auto

) 1.2H, Eosinophils # (Auto) 0.1, Basophils # (Auto) 0.0, Sodium Level 141, 

Potassium Level 3.9, Chloride Level 112#H, Carbon Dioxide Level 23, Anion Gap 6

, Blood Urea Nitrogen 11, Creatinine 0.72, Estimat Glomerular Filtration Rate > 

60, BUN/Creatinine Ratio 15, Glucose Level 121H, Lactic Acid Level 0.77, 

Calcium Level 8.0L, Total Bilirubin 0.2, Aspartate Amino Transf (AST/SGOT) 9, 

Alanine Aminotransferase (ALT/SGPT) 11, Alkaline Phosphatase 68, C-Reactive 

Protein High Sensitivity 1.92H, Total Protein 5.6L, Albumin 2.9L





General Appearance:  Anxious, Mild Distress


Respiratory:  Lungs Clear


Cardiovascular:  Regular Rate, Rhythm


Abdominal:  normal bowel sounds


Gynecology/General:  Other (3x4 cm labial lesion, there is an area of erythema 

limited to the abscess, no surrounding evidence of cellulitis)


Labia:  Left, Abscess


Labia Mass:  soft, tender


Pelvic Exam:  deferred (could not tolerate)





Assessment/Plan


Assessment and Plan


Diagnosis:


21 yo G0 with Left labial abscess


Failed conservative treatment





P:


Plan of care discussed with the patient


I and D of left labial abscess, I discussed with her the details of the 

procedure, and possibility of postoperative packing and wound care as well as 

prolonged course of antibiotics.


Problems:  





Admission Diagnosis


21 yo G0 with Left labial abscess


Failed conservative treatment





Clinical Quality Measures


DVT/VTE Risk/Contraindication:


Risk Factor Score Per Nursin


RFS Level Per Nursing on Admit:  1=Low/No VTE PPX











SALLY CANDELARIO DO Dec 18, 2017 08:31

## 2017-12-19 VITALS — SYSTOLIC BLOOD PRESSURE: 107 MMHG | DIASTOLIC BLOOD PRESSURE: 63 MMHG

## 2017-12-19 VITALS — DIASTOLIC BLOOD PRESSURE: 61 MMHG | SYSTOLIC BLOOD PRESSURE: 100 MMHG

## 2017-12-19 VITALS — SYSTOLIC BLOOD PRESSURE: 95 MMHG | DIASTOLIC BLOOD PRESSURE: 53 MMHG

## 2017-12-19 RX ADMIN — HYDROCODONE BITARTRATE AND ACETAMINOPHEN PRN TAB: 5; 325 TABLET ORAL at 00:48

## 2017-12-19 RX ADMIN — HYDROCODONE BITARTRATE AND ACETAMINOPHEN PRN TAB: 5; 325 TABLET ORAL at 04:58

## 2017-12-19 RX ADMIN — DOCUSATE SODIUM SCH MG: 100 CAPSULE ORAL at 08:50

## 2017-12-19 RX ADMIN — METRONIDAZOLE SCH MLS/HR: 5 INJECTION, SOLUTION INTRAVENOUS at 02:41

## 2017-12-19 RX ADMIN — SODIUM CHLORIDE, SODIUM LACTATE, POTASSIUM CHLORIDE, CALCIUM CHLORIDE, AND DEXTROSE MONOHYDRATE SCH MLS/HR: 600; 310; 30; 20; 5 INJECTION, SOLUTION INTRAVENOUS at 06:56

## 2017-12-19 RX ADMIN — KETOROLAC TROMETHAMINE PRN MG: 30 INJECTION, SOLUTION INTRAMUSCULAR; INTRAVENOUS at 02:41

## 2017-12-19 NOTE — DISCHARGE INST-WOMEN'S SERVICE
Discharge Inst-Women's Serv


Depart Medication/Instructions


New, Converted or Re-Newed RX:  RX on Chart





Consults/Follow Up


Additional Follow Up:  Yes


Orders/Referrals


DR. Banks on Thursday





Activity


Activity:  Activity as Tolerated


Driving Instructions:  No Driving for 1 Week


NO SMOKING:  NO SMOKING


Nothing Inside Vagina:  No Douching, No [place], No Tampons





Diet


Discharge Diet:  No Restrictions


Symptoms to Report to :  Bleeding Excessive, Pain Increased, Fever Over 101 

Degrees F, Vaginal Bleeding Increase, Questions/Concerns


For Any Problems or Questions:  Contact Your Physician





Skin/Wound Care


Infection Signs and Symptoms:  Increased Redness, Increased Swelling, 

Temperature Above 101  F


Bathing Instructions:  SALLY Beach DO Dec 19, 2017 08:29

## 2017-12-19 NOTE — PROGRESS NOTE-STANDARD
Standard Progress Note


Progress Notes/Assess & Plan


Date Seen by Provider:  Dec 19, 2017


Time Seen by Provider:  08:30


Progress/Assessment & Plan


Patient reports feeling much better yesterday afternoon, and this AM.  Reports 

expected discomfort, but is now ambulating and voiding freely since catheter 

was removed. 








Vital Sign - Last 24 Hours








 12/18/17 12/18/17 12/18/17 12/18/17





 12:09 14:05 16:28 20:40


 


Temp 98.3 98.1 97.9 97.7


 


Pulse 79 82 77 81


 


Resp 18 18 18 18


 


B/P (MAP) 117/72 (87) 119/72 (88) 121/77 (92) 96/55 (69)


 


Pulse Ox 100 100 100 97


 


O2 Delivery Room Air Room Air Room Air Room Air


 


    





 12/19/17 12/19/17  





 00:48 04:58  


 


Temp 97.2 97.5  


 


Pulse 85 71  


 


Resp 18 18  


 


B/P (MAP) 95/53 (67) 107/63 (78)  


 


Pulse Ox 99 99  


 


O2 Delivery Room Air Room Air  














Intake and Output   


 


 12/18/17 12/18/17 12/19/17





 15:00 23:00 07:00


 


Intake Total 1300 ml  1300 ml


 


Output Total 560 ml 1350 ml 1300 ml


 


Balance 740 ml -1350 ml 0 ml





Packing out of wound with bloody discharge noted.  Erythema and swelling has 

significantly gone down





Diagnosis:


POD 1 I and D labial abscess





P:


DC today with 10 antibiotic course of levaquin and flagyl


Follow up in my office on Thursday for packing change


Precautions given to patient











KODAKSALLY ZACH MATOS Dec 19, 2017 08:32

## 2020-07-15 ENCOUNTER — HOSPITAL ENCOUNTER (OUTPATIENT)
Dept: HOSPITAL 75 - SDC | Age: 25
Discharge: HOME | End: 2020-07-15
Attending: OBSTETRICS & GYNECOLOGY
Payer: MEDICAID

## 2020-07-15 VITALS — SYSTOLIC BLOOD PRESSURE: 107 MMHG | DIASTOLIC BLOOD PRESSURE: 72 MMHG

## 2020-07-15 VITALS — SYSTOLIC BLOOD PRESSURE: 110 MMHG | DIASTOLIC BLOOD PRESSURE: 67 MMHG

## 2020-07-15 VITALS — SYSTOLIC BLOOD PRESSURE: 109 MMHG | DIASTOLIC BLOOD PRESSURE: 76 MMHG

## 2020-07-15 VITALS — DIASTOLIC BLOOD PRESSURE: 71 MMHG | SYSTOLIC BLOOD PRESSURE: 108 MMHG

## 2020-07-15 VITALS — SYSTOLIC BLOOD PRESSURE: 111 MMHG | DIASTOLIC BLOOD PRESSURE: 65 MMHG

## 2020-07-15 VITALS — HEIGHT: 62.99 IN | BODY MASS INDEX: 23.98 KG/M2 | WEIGHT: 135.36 LBS

## 2020-07-15 VITALS — SYSTOLIC BLOOD PRESSURE: 107 MMHG | DIASTOLIC BLOOD PRESSURE: 74 MMHG

## 2020-07-15 VITALS — SYSTOLIC BLOOD PRESSURE: 115 MMHG | DIASTOLIC BLOOD PRESSURE: 70 MMHG

## 2020-07-15 VITALS — DIASTOLIC BLOOD PRESSURE: 67 MMHG | SYSTOLIC BLOOD PRESSURE: 110 MMHG

## 2020-07-15 VITALS — SYSTOLIC BLOOD PRESSURE: 115 MMHG | DIASTOLIC BLOOD PRESSURE: 71 MMHG

## 2020-07-15 DIAGNOSIS — N75.0: ICD-10-CM

## 2020-07-15 DIAGNOSIS — N76.4: Primary | ICD-10-CM

## 2020-07-15 DIAGNOSIS — E66.9: ICD-10-CM

## 2020-07-15 DIAGNOSIS — Z79.899: ICD-10-CM

## 2020-07-15 LAB
BASOPHILS # BLD AUTO: 0 10^3/UL (ref 0–0.1)
BASOPHILS NFR BLD AUTO: 0 % (ref 0–10)
EOSINOPHIL # BLD AUTO: 0 10^3/UL (ref 0–0.3)
EOSINOPHIL NFR BLD AUTO: 0 % (ref 0–10)
ERYTHROCYTE [DISTWIDTH] IN BLOOD BY AUTOMATED COUNT: 12.2 % (ref 10–14.5)
HCT VFR BLD CALC: 42 % (ref 35–52)
HGB BLD-MCNC: 14.7 G/DL (ref 11.5–16)
LYMPHOCYTES # BLD AUTO: 1.6 X 10^3 (ref 1–4)
LYMPHOCYTES NFR BLD AUTO: 13 % (ref 12–44)
MANUAL DIFFERENTIAL PERFORMED BLD QL: NO
MCH RBC QN AUTO: 32 PG (ref 25–34)
MCHC RBC AUTO-ENTMCNC: 35 G/DL (ref 32–36)
MCV RBC AUTO: 90 FL (ref 80–99)
MONOCYTES # BLD AUTO: 1.3 X 10^3 (ref 0–1)
MONOCYTES NFR BLD AUTO: 11 % (ref 0–12)
NEUTROPHILS # BLD AUTO: 9.4 X 10^3 (ref 1.8–7.8)
NEUTROPHILS NFR BLD AUTO: 76 % (ref 42–75)
PLATELET # BLD: 306 10^3/UL (ref 130–400)
PMV BLD AUTO: 9.4 FL (ref 7.4–10.4)
WBC # BLD AUTO: 12.3 10^3/UL (ref 4.3–11)

## 2020-07-15 PROCEDURE — 87635 SARS-COV-2 COVID-19 AMP PRB: CPT

## 2020-07-15 PROCEDURE — 87205 SMEAR GRAM STAIN: CPT

## 2020-07-15 PROCEDURE — 85025 COMPLETE CBC W/AUTO DIFF WBC: CPT

## 2020-07-15 PROCEDURE — 87075 CULTR BACTERIA EXCEPT BLOOD: CPT

## 2020-07-15 PROCEDURE — 86901 BLOOD TYPING SEROLOGIC RH(D): CPT

## 2020-07-15 PROCEDURE — 86850 RBC ANTIBODY SCREEN: CPT

## 2020-07-15 PROCEDURE — 87070 CULTURE OTHR SPECIMN AEROBIC: CPT

## 2020-07-15 PROCEDURE — 36415 COLL VENOUS BLD VENIPUNCTURE: CPT

## 2020-07-15 PROCEDURE — 87076 CULTURE ANAEROBE IDENT EACH: CPT

## 2020-07-15 PROCEDURE — 87081 CULTURE SCREEN ONLY: CPT

## 2020-07-15 PROCEDURE — 84703 CHORIONIC GONADOTROPIN ASSAY: CPT

## 2020-07-15 PROCEDURE — 86900 BLOOD TYPING SEROLOGIC ABO: CPT

## 2020-07-15 NOTE — XMS REPORT
Continuity of Care Document

                             Created on: 10/14/2013



GIOVANNA BARLOW

External Reference #: [id number]

: 1995

Sex: Female



Demographics





                          Address                   [address]

[place]  [address]

 

                          Home Phone                [phone]

 

                          Preferred Language        Unknown

 

                          Marital Status            Unknown

 

                          Hoahaoism Affiliation     Unknown

 

                          Race                      Unknown

 

                          Ethnic Group              Unknown





Author





                          Author                    [facility]

 

                          Organization              [facility]

 

                          Address                   Unknown

 

                          Phone                     Unavailable







Support





                Name            Relationship    Address         Phone

 

                    AVINASH SANCHEZ         Next Of Kin         [address]

[place]  [address] [phone]







Care Team Providers





                    Care Team Member Name Role                Phone

 

                    [facility] OF                   [phone]



                                            



Insurance Providers

                      



                    Payer Name                   Policy Number                  

 Subscriber Name    

                                        Relationship                

 

                    Medicaid [place]                   [id number]               

    Giovanna Barlow

                                        01 Self / Same As Patient               

 



                                                                    



Advance Directives

                      



                    Directive                   Response                   Recor

ded Date            

   

 

                    Advance Directives                   N                   10/

14/13 7:45pm        

       

 

                    Health Care Power of                    N           

        10/14/13 

7:45pm                

 

                    Organ Donor                   Y                   10/14/13 7

:45pm               





                                                                                
       



Problems

          No Known Problems or Medical conditions.                              
                                     



Social History

                      



                    History                   Response                   Recorde

d Date/Time         

      

 

                    Alcohol Use                   Denies Use                   1

 7:45pm      

         

 

                    Recreational Drug Use                   N                   

10/14/13 7:45pm     

          

 

                    Sexually Transmitted Disease                   N            

       10/14/13 

7:45pm                

 

                    HIV/AIDS                   N                   10/14/13 7:45

pm                



                                                                                
       



Allergies, Adverse Reactions, Alerts

                      



                Allergen                   Type                   Severity      

             

Reaction                                Last Updated                

 

                    No Known Drug Allergies                                     

                    

                                                    12                



                                                                                
                 



Medications

                      



                Medication                   Dose                   Units       

            

Route                   Sig                   Qty                   Days        

       

 

                          No Active Prescriptions or Reported Medications       

                          

                                                                                

           

                                                         



                                                                              



Pregnancy

                      



                          Response                   Recorded Date/Time         

       

 

                          Status not known                   Unknown            

    



                                                                              



Results

          No Known Relevant Diagnostic Tests, Laboratory Data and/or Discharge 
Summary.                                                                    



Encounters

                      



                    Encounter                   Location                   Date/

Time                

 

                    Registered Emergency Room                   [facility]   

                

10/14/13 7:39pm                

 

                    Departed Emergency Room                   [facility]     

              

12 10:14pm

## 2020-07-15 NOTE — ANESTHESIA-GENERAL POST-OP
General


Patient Condition


Mental Status/LOC:  Same as Preop


Cardiovascular:  Satisfactory


Nausea/Vomiting:  Absent


Respiratory:  Satisfactory


Pain:  Controlled


Complications:  Absent





Post Op Complications


Complications


None





Follow Up Care/Instructions


Patient Instructions


None needed.





Anesthesia/Patient Condition


Patient Condition


Patient is doing well, no complaints, stable vital signs, no apparent adverse 

anesthesia problems.   


No complications reported per nursing.











DEYSI PAUL CRNA            Jul 15, 2020 08:48

## 2020-07-15 NOTE — XMS REPORT
[facility]

                             Created on: 2018



Giovanna Kuo

External Reference #: [id number]

: 1995

Sex: Female



Demographics





                          Address                   [address] [place]  [address]

 

                          Preferred Language        Unknown

 

                          Marital Status            Unknown

 

                          Mandaen Affiliation     Unknown

 

                          Race                      Unknown

 

                          Ethnic Group              Unknown





Author





                          Author                    Giovanna ELAINE

 

                          [facility]

 

                          Address                   [address] [place]  [address]



 

                          Phone                     [phone]







Care Team Providers





                    Care Team Member Name Role                Phone

 

                    DOC ELAINE Unavailable         [phone]







PROBLEMS





          Type      Condition ICD9-CM Code RWP05-OB Code Onset Dates Condition S

tatus SNOMED 

Code

 

          Problem   Swelling of labia           N94.89              Active    28

[id number]

 

          Problem   Solitary cyst of breast 610.0                         Active

    [id number]

 

          Problem   Dysuria   788.1                         Active    [id number]

 

          Problem   Routine infant or child health check V20.2                  

       Active    [id number]







ALLERGIES

No Known Allergies



ENCOUNTERS





                Encounter       Location        Date            Diagnosis

 

                          [facility]  [id number]

[address] [place] [address] 

[id number]                19 Dec, 2017               

 

                          [facility]  [id number]

[address] [place] [address] 

[id number]                15 Dec, 2017              Pelvic pain R10.2 ; Dysuria 

R30.0 and Swelling of labia 

N94.89

 

                          [facility]     [address] [id number]

[address] [place] [id number]

                          26 Mar, 2013               

 

                          [place] [id number]

[address] [place] [id number]

                          13 Aug, 2011               

 

                          [place] [address] [id number]

[address] [place] [id number]

                          15 Sep, 2010               







IMMUNIZATIONS





                Vaccine         Route           Administration Date Status

 

                ROCEPHIN 1 GM (IM) IM Intramuscular Dec 15, 2017    Administered







SOCIAL HISTORY

Never Assessed



REASON FOR VISIT

pt had a yeast infection a week ago according to pt...symptoms are getting worse
. vaginal itching, vaginal burning, white vaginal discharge, dysuria. all been g
oing on for a week. kbleonidasrestelle, last menstural period was 3 weeks ago. wants STD
testing., rocephin given at 1055. pt to waiting room and will wait 15 minutes. 
if no s/s of distress or reaction ...pt will be discharged. report given to daljit pettit rn.



PLAN OF CARE





                          Activity                  Details

 

                                         

 

                          Follow Up                 1 Week Reason:







VITAL SIGNS





                    Height              63.75 in            2017-12-15

 

                    Weight              156.6 lbs           2017-12-15

 

                    Temperature         98.0 degrees Fahrenheit 2017-12-15

 

                    Heart Rate          80 bpm              2017-12-15

 

                    Respiratory Rate    20                  2017-12-15

 

                    BMI                 27.09 kg/m2         2017-12-15

 

                    Blood pressure systolic 104 mmHg            2017-12-15

 

                    Blood pressure diastolic 68 mmHg             2017-12-15







MEDICATIONS





        Medication Instructions Dosage  Frequency Start Date End Date Duration S

tatus

 

        Azithromycin 500 MG Orally once 2 tablets         15 Dec, 2017 16 Dec, 

017 1 days  

Active







RESULTS

No Results



PROCEDURES





                Procedure       Date Ordered    Result          Body Site

 

                No Charge       Dec 15, 2017                     

 

                [facility]                     

 

                THER/PROPH/DIAG INJ, SC/IM Dec 15, 2017                     

 

                URINALYSIS, AUTO, W/O SCOPE Dec 15, 2017                     

 

                Bacterial Vaginosis In House Dec 15, 2017                     

 

                ROCEPHIN 1 GM (IM) Dec 15, 2017                     







INSTRUCTIONS





MEDICATIONS ADMINISTERED

No Known Medications

## 2020-07-15 NOTE — OPERATIVE REPORT
DATE OF SERVICE:  



PREOPERATIVE DIAGNOSIS:

Right labial abscess.



POSTOPERATIVE DIAGNOSIS:

Right labial abscess.



PROCEDURE PERFORMED:

Incision and drainage of right labial abscess.



SURGEON:

Sally Candelario DO.



ANESTHESIA:

LMA general.



ESTIMATED BLOOD LOSS:

Minimal.



URINE OUTPUT:

50 mL, clear and drained at the end of procedure.



FLUIDS:

750 mL lactated Ringer's solution.



FINDINGS:

An enlarged right labial with an abscess pocket that was identified and purulent

exudate expressed from the pocket.  Otherwise, grossly normal external female

genitalia.



SPECIMEN SENT:

Culture of the abscess pocket.



INDICATIONS FOR PROCEDURE:

This 24-year-old female is a patient, who had come to my office with an acute

onset of right labial swelling.  She was seen at [facility] last

week and was started on Bactrim antibiotics for the entire week.  This did not

improve her symptoms and in fact they significantly got worse in the past day. 

She was either heading to the emergency room; however, I had her come into my

office instead of going to the emergency room.  In the office,.  I identified

what appeared to be a labial abscess.  The patient was admitted to shaving of

the genitalia, likely the cause of this abscess formation.  She had this

occurred in the past.  I discussed with the patient incision and drainage of

this area due to an abscess formation approximately 1 cm in depth and

approximately 3 cm to 4 cm in length from anterior to posterior.  Risks of

procedure were discussed with the patient in detail.  After all of her questions

were answered, consent was obtained and the patient was taken to the operating

room.



OPERATIVE REPORT IN DETAIL:

Once in the operating room, anesthesia was found to be adequate.  She was placed

in the dorsal lithotomy position, prepped and draped in normal sterile fashion. 

A timeout was performed.  I identified the margin of the abscess on the

mucocutaneous junction of the right labia, at which point, I made a stab

incision and extended this approximately 1 cm to 1.5 cm in length.  The purulent

exudate was expressed from this pocket and anaerobic cultures were taken from

this pocket.  I then expressed everything up that came out of this pocket and

copiously irrigated the pocket using a normal saline, after which I packed the

abscess pocket using quarter inch iodoform gauze.  There was no active bleeding

noted from the incision plane at that point.  The patient tolerated the

procedure well and sent to recovery in stable condition.  Lap and sponge counts

were correct at the end of the procedure.  Instrument count was correct as well.

 A 500 mg of Levaquin were given intraoperatively for infection prophylaxis and

the patient was sent home on a 7-day course of oral 500 mg daily Levaquin and

will follow up in my office in 2 to 3 days for repacking of the abscess.





Job ID: [id number]

DocumentID: [id number]

Dictated Date:  07/15/2020 08:48:28

Transcription Date: 07/15/2020 09:16:32

Dictated By: SALLY CANDELARIO DO

## 2020-07-15 NOTE — XMS REPORT
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         

 

                    CHLAMYDIA TRACHOMATIS RNA, TMA           NOT DETECTED       

     NOT DETECTED   

     

 

                    NEISSERIA GONORRHOEAE RNA, TMA           NOT DETECTED       

     NOT DETECTED   

     

 

                    COMMENT                                 NRG        

 

                                        CULTURE, GENITAL - 19 14:23       

  

 

                    CULTURE, GENITAL           SEE NOTE            NRG        



                                                            



Encounters

      



                ACCT No.           Visit Date/Time           Discharge          

 Status         

             Pt. Type           Provider           Facility           Loc./Unit 

          

Complaint        

 

                [id number]           07/10/2020 14:45:00           07/10/2020 23:59:5

9           CLS 

                Outpatient           CARLO LAC, BRADLEY                          

 [facility] 

[facility]                                

 

             [id number]           2019 13:20:00                              

       Document

 Registration                                                                   

 

 

             [id number]           12/15/2017 10:15:00                              

       Document

 Registration                                                                   

 

 

             [id number]           2018 17:44:02                        ACT    

       Unknown

                                                                         

 

                    [id number]           2017 20:45:00           

017 09:20:00        

                DIS             Inpatient           SALLY CANDELARIO DO        

   [facility]           WS                        SEPSIS,LABIAL ABSCESS  

      

 

                    [id number]           10/14/2013 19:39:00           10/14/2

013 21:17:00        

                DIS             Emergency           CAITLIN BLACKWELL MD      

     [facility] 

[facility]           ER                        VOLLEYBALL INJ 

TO HEAD      

  

 

             [id number]           07/15/2020 06:36:00                        A

CT           

Outpatient                SALLY CANDELARIO DO           [facility]                 SDC                       RIGHT LABIAL ABSCESS        

 

                [id number]           2013 14:53:00           2013 23:59:

59           CLS

             Outpatient

## 2020-07-15 NOTE — XMS REPORT
[facility]

                             Created on: 2018



Giovanna Kuo

External Reference #: [id number]

: 1995

Sex: Female



Demographics





                          Address                   [address] [place]  [address]

 

                          Preferred Language        Unknown

 

                          Marital Status            Unknown

 

                          Zoroastrian Affiliation     Unknown

 

                          Race                      Unknown

 

                          Ethnic Group              Unknown





Author





                          Author                    Giovanna ELAINE

 

                          Organization              [facility]

 

                          Address                   [address] [place]  [address]



 

                          Phone                     [phone]







Care Team Providers





                    Care Team Member Name Role                Phone

 

                    DOC ELAINE Unavailable         [phone]







PROBLEMS





          Type      Condition ICD9-CM Code DRE35-UD Code Onset Dates Condition S

tatus SNOMED 

Code

 

          Problem   Swelling of labia           N94.89              Active    28

[id number]

 

          Problem   Solitary cyst of breast 610.0                         Active

    [id number]

 

          Problem   Dysuria   788.1                         Active    [id number]

 

          Problem   Routine infant or child health check V20.2                  

       Active    [id number]







ALLERGIES

No Information



ENCOUNTERS





                Encounter       Location        Date            Diagnosis

 

                          [facility] [place] [facility]  [id number]

[address] [place] [address] 

[id number]                19 Dec, 2017               

 

                          [facility]  [id number]

[address] [place] [address] 

[id number]                15 Dec, 2017              Pelvic pain R10.2 ; Dysuria 

R30.0 and Swelling of labia 

N94.89

 

                          [facility]     [address] [id number]

[address] [place] [id number]

                          26 Mar, 2013               

 

                          [facility]     [id number]

[address] [place] [address]

                          13 Aug, 2011               

 

                          [facility]     [address] [id number]

[address] [place] [address]

                          15 Sep, 2010               







IMMUNIZATIONS

No Known Immunizations



SOCIAL HISTORY

Never Assessed



REASON FOR VISIT





PLAN OF CARE





VITAL SIGNS





MEDICATIONS

Unknown Medications



RESULTS

No Results



PROCEDURES

No Known procedures



INSTRUCTIONS





MEDICATIONS ADMINISTERED

No Known Medications

## 2020-07-15 NOTE — DISCHARGE INST-WOMEN'S SERVICE
Discharge Inst-Women's Serv


Depart Medication/Instructions


New, Converted or Re-Newed RX:  RX on Chart


Problems Reviewed?:  Yes





Consults/Follow Up


Additional Follow Up:  Yes


Orders/Referrals


Dr. Candelario in 7-10 days





Activity


Activity:  Activity as Tolerated


Driving Instructions:  You May Drive


NO SMOKING:  NO SMOKING


Nothing Inside Vagina:  No Douching, No [place], No Tampons





Diet


Discharge Diet:  No Restrictions


Symptoms to Report to :  Bleeding Excessive, Pain Increased, Fever Over 101 

Degrees F, Vaginal Bleeding Increase, Questions/Concerns


For Any Problems or Questions:  Contact Your Physician











SALLY CANDELARIO DO            Jul 15, 2020 07:13